# Patient Record
Sex: FEMALE | Race: OTHER | HISPANIC OR LATINO | Employment: FULL TIME | URBAN - NONMETROPOLITAN AREA
[De-identification: names, ages, dates, MRNs, and addresses within clinical notes are randomized per-mention and may not be internally consistent; named-entity substitution may affect disease eponyms.]

---

## 2024-06-05 ENCOUNTER — HOSPITAL ENCOUNTER (EMERGENCY)
Facility: HOSPITAL | Age: 49
Discharge: HOME | End: 2024-06-05
Attending: EMERGENCY MEDICINE
Payer: COMMERCIAL

## 2024-06-05 ENCOUNTER — APPOINTMENT (OUTPATIENT)
Dept: RADIOLOGY | Facility: HOSPITAL | Age: 49
End: 2024-06-05
Payer: COMMERCIAL

## 2024-06-05 VITALS
WEIGHT: 170 LBS | OXYGEN SATURATION: 100 % | TEMPERATURE: 97.3 F | RESPIRATION RATE: 15 BRPM | HEART RATE: 70 BPM | SYSTOLIC BLOOD PRESSURE: 132 MMHG | BODY MASS INDEX: 31.28 KG/M2 | DIASTOLIC BLOOD PRESSURE: 74 MMHG | HEIGHT: 62 IN

## 2024-06-05 DIAGNOSIS — N83.201 CYST OF RIGHT OVARY: Primary | ICD-10-CM

## 2024-06-05 LAB
ALBUMIN SERPL BCP-MCNC: 4.1 G/DL (ref 3.4–5)
ALP SERPL-CCNC: 50 U/L (ref 33–110)
ALT SERPL W P-5'-P-CCNC: 11 U/L (ref 7–45)
ANION GAP SERPL CALC-SCNC: 9 MMOL/L (ref 10–20)
APPEARANCE UR: CLEAR
AST SERPL W P-5'-P-CCNC: 16 U/L (ref 9–39)
BASOPHILS # BLD AUTO: 0.02 X10*3/UL (ref 0–0.1)
BASOPHILS NFR BLD AUTO: 0.6 %
BILIRUB SERPL-MCNC: 0.2 MG/DL (ref 0–1.2)
BILIRUB UR STRIP.AUTO-MCNC: NEGATIVE MG/DL
BUN SERPL-MCNC: 25 MG/DL (ref 6–23)
CALCIUM SERPL-MCNC: 8.8 MG/DL (ref 8.6–10.3)
CHLORIDE SERPL-SCNC: 104 MMOL/L (ref 98–107)
CO2 SERPL-SCNC: 28 MMOL/L (ref 21–32)
COLOR UR: YELLOW
CREAT SERPL-MCNC: 0.73 MG/DL (ref 0.5–1.05)
EGFRCR SERPLBLD CKD-EPI 2021: >90 ML/MIN/1.73M*2
EOSINOPHIL # BLD AUTO: 0.03 X10*3/UL (ref 0–0.7)
EOSINOPHIL NFR BLD AUTO: 0.9 %
ERYTHROCYTE [DISTWIDTH] IN BLOOD BY AUTOMATED COUNT: 15.8 % (ref 11.5–14.5)
GLUCOSE SERPL-MCNC: 81 MG/DL (ref 74–99)
GLUCOSE UR STRIP.AUTO-MCNC: NEGATIVE MG/DL
HCT VFR BLD AUTO: 38.2 % (ref 36–46)
HGB BLD-MCNC: 11.8 G/DL (ref 12–16)
HOLD SPECIMEN: NORMAL
HYALINE CASTS #/AREA URNS AUTO: ABNORMAL /LPF
IMM GRANULOCYTES # BLD AUTO: 0 X10*3/UL (ref 0–0.7)
IMM GRANULOCYTES NFR BLD AUTO: 0 % (ref 0–0.9)
KETONES UR STRIP.AUTO-MCNC: NEGATIVE MG/DL
LACTATE SERPL-SCNC: 1.3 MMOL/L (ref 0.4–2)
LEUKOCYTE ESTERASE UR QL STRIP.AUTO: ABNORMAL
LYMPHOCYTES # BLD AUTO: 1.17 X10*3/UL (ref 1.2–4.8)
LYMPHOCYTES NFR BLD AUTO: 36 %
MCH RBC QN AUTO: 24.3 PG (ref 26–34)
MCHC RBC AUTO-ENTMCNC: 30.9 G/DL (ref 32–36)
MCV RBC AUTO: 79 FL (ref 80–100)
MONOCYTES # BLD AUTO: 0.35 X10*3/UL (ref 0.1–1)
MONOCYTES NFR BLD AUTO: 10.8 %
MUCOUS THREADS #/AREA URNS AUTO: ABNORMAL /LPF
NEUTROPHILS # BLD AUTO: 1.68 X10*3/UL (ref 1.2–7.7)
NEUTROPHILS NFR BLD AUTO: 51.7 %
NITRITE UR QL STRIP.AUTO: NEGATIVE
NRBC BLD-RTO: 0 /100 WBCS (ref 0–0)
PH UR STRIP.AUTO: 5 [PH]
PLATELET # BLD AUTO: 242 X10*3/UL (ref 150–450)
POTASSIUM SERPL-SCNC: 3.7 MMOL/L (ref 3.5–5.3)
PROT SERPL-MCNC: 6.8 G/DL (ref 6.4–8.2)
PROT UR STRIP.AUTO-MCNC: ABNORMAL MG/DL
RBC # BLD AUTO: 4.85 X10*6/UL (ref 4–5.2)
RBC # UR STRIP.AUTO: NEGATIVE /UL
RBC #/AREA URNS AUTO: ABNORMAL /HPF
SODIUM SERPL-SCNC: 137 MMOL/L (ref 136–145)
SP GR UR STRIP.AUTO: 1.03
SQUAMOUS #/AREA URNS AUTO: ABNORMAL /HPF
UROBILINOGEN UR STRIP.AUTO-MCNC: <2 MG/DL
WBC # BLD AUTO: 3.3 X10*3/UL (ref 4.4–11.3)
WBC #/AREA URNS AUTO: ABNORMAL /HPF

## 2024-06-05 PROCEDURE — 96374 THER/PROPH/DIAG INJ IV PUSH: CPT | Mod: 59

## 2024-06-05 PROCEDURE — 2500000004 HC RX 250 GENERAL PHARMACY W/ HCPCS (ALT 636 FOR OP/ED)

## 2024-06-05 PROCEDURE — 85025 COMPLETE CBC W/AUTO DIFF WBC: CPT | Performed by: EMERGENCY MEDICINE

## 2024-06-05 PROCEDURE — 76830 TRANSVAGINAL US NON-OB: CPT

## 2024-06-05 PROCEDURE — 2500000004 HC RX 250 GENERAL PHARMACY W/ HCPCS (ALT 636 FOR OP/ED): Performed by: EMERGENCY MEDICINE

## 2024-06-05 PROCEDURE — 81001 URINALYSIS AUTO W/SCOPE: CPT | Performed by: EMERGENCY MEDICINE

## 2024-06-05 PROCEDURE — 36415 COLL VENOUS BLD VENIPUNCTURE: CPT | Performed by: EMERGENCY MEDICINE

## 2024-06-05 PROCEDURE — 99285 EMERGENCY DEPT VISIT HI MDM: CPT | Mod: 25

## 2024-06-05 PROCEDURE — 80053 COMPREHEN METABOLIC PANEL: CPT | Performed by: EMERGENCY MEDICINE

## 2024-06-05 PROCEDURE — 74177 CT ABD & PELVIS W/CONTRAST: CPT

## 2024-06-05 PROCEDURE — 2550000001 HC RX 255 CONTRASTS: Performed by: EMERGENCY MEDICINE

## 2024-06-05 PROCEDURE — 83605 ASSAY OF LACTIC ACID: CPT | Performed by: EMERGENCY MEDICINE

## 2024-06-05 PROCEDURE — 96361 HYDRATE IV INFUSION ADD-ON: CPT

## 2024-06-05 PROCEDURE — 87086 URINE CULTURE/COLONY COUNT: CPT | Mod: CONLAB | Performed by: EMERGENCY MEDICINE

## 2024-06-05 RX ORDER — KETOROLAC TROMETHAMINE 30 MG/ML
30 INJECTION, SOLUTION INTRAMUSCULAR; INTRAVENOUS ONCE
Status: COMPLETED | OUTPATIENT
Start: 2024-06-05 | End: 2024-06-05

## 2024-06-05 RX ORDER — KETOROLAC TROMETHAMINE 30 MG/ML
INJECTION, SOLUTION INTRAMUSCULAR; INTRAVENOUS
Status: COMPLETED
Start: 2024-06-05 | End: 2024-06-05

## 2024-06-05 RX ORDER — TRAMADOL HYDROCHLORIDE 50 MG/1
50 TABLET ORAL EVERY 6 HOURS PRN
Qty: 12 TABLET | Refills: 0 | Status: SHIPPED | OUTPATIENT
Start: 2024-06-05 | End: 2024-06-08

## 2024-06-05 RX ORDER — SODIUM CHLORIDE 9 MG/ML
125 INJECTION, SOLUTION INTRAVENOUS CONTINUOUS
Status: DISCONTINUED | OUTPATIENT
Start: 2024-06-05 | End: 2024-06-05 | Stop reason: HOSPADM

## 2024-06-05 RX ADMIN — KETOROLAC TROMETHAMINE 30 MG: 30 INJECTION, SOLUTION INTRAMUSCULAR; INTRAVENOUS at 09:27

## 2024-06-05 RX ADMIN — KETOROLAC TROMETHAMINE 30 MG: 30 INJECTION, SOLUTION INTRAMUSCULAR at 09:27

## 2024-06-05 RX ADMIN — SODIUM CHLORIDE 125 ML/HR: 9 INJECTION, SOLUTION INTRAVENOUS at 09:27

## 2024-06-05 RX ADMIN — IOHEXOL 75 ML: 350 INJECTION, SOLUTION INTRAVENOUS at 11:10

## 2024-06-05 ASSESSMENT — PAIN - FUNCTIONAL ASSESSMENT: PAIN_FUNCTIONAL_ASSESSMENT: 0-10

## 2024-06-05 ASSESSMENT — COLUMBIA-SUICIDE SEVERITY RATING SCALE - C-SSRS
2. HAVE YOU ACTUALLY HAD ANY THOUGHTS OF KILLING YOURSELF?: NO
6. HAVE YOU EVER DONE ANYTHING, STARTED TO DO ANYTHING, OR PREPARED TO DO ANYTHING TO END YOUR LIFE?: NO
1. IN THE PAST MONTH, HAVE YOU WISHED YOU WERE DEAD OR WISHED YOU COULD GO TO SLEEP AND NOT WAKE UP?: NO

## 2024-06-05 ASSESSMENT — PAIN SCALES - GENERAL: PAINLEVEL_OUTOF10: 8

## 2024-06-05 NOTE — ED PROVIDER NOTES
[facility]   [facility]  Provider Note  6/5/2024  8:52 AM  AC11/AC11      Chief Complaint   Patient presents with    Abdominal Pain     Pt has LRQ pain she associates with her known ovarian cysts. Pt said the pain started around 1am and has progressively gotten worse. Pt said she goes to [facility] and she feels like they have been blowing her off the last few months.         History of Present Illness:   Nicole Finnegan is a 48 y.o. female presenting to the ED for right-sided pelvic pain, beginning yesterday.  The complaint has been persistent, moderate in severity, and worsened by nothing.  Patient is status post tubal ligation.  She has been experiencing intermittent pelvic pain for the past several months.  Sometimes during her menses sometimes after her menses.  She has been seen by the OB staff in [place] without resolution of her symptoms.  She denies any vaginal discharge or STD exposure.  She denies any urinary frequency or burning.  She has no flank pain.  She has no history of appendicitis or previous diverticulitis.  She is status post tubal ligation.      Review of Systems:   Pertinent positives and review of systems as noted above.  Remaining 10 review of systems is negative or noncontributory to today's episode of care.  Review of Systems       --------------------------------------------- PAST HISTORY ---------------------------------------------  Past Medical History: Pelvic pain    Past Surgical History: Tubal ligation    Social History:   Social History     Social History Narrative    Not on file        Family History: family history is not on file. Unless otherwise noted, family history is non contributory    Patient's Medications    No medications on file      The patient’s home medications have been reviewed.    Allergies: Sulfa (sulfonamide antibiotics)    -------------------------------------------------- RESULTS -------------------------------------------------  All laboratory and radiology results  have been personally reviewed by myself   LABS:  Labs Reviewed   CBC WITH AUTO DIFFERENTIAL - Abnormal       Result Value    WBC 3.3 (*)     nRBC 0.0      RBC 4.85      Hemoglobin 11.8 (*)     Hematocrit 38.2      MCV 79 (*)     MCH 24.3 (*)     MCHC 30.9 (*)     RDW 15.8 (*)     Platelets 242      Neutrophils % 51.7      Immature Granulocytes %, Automated 0.0      Lymphocytes % 36.0      Monocytes % 10.8      Eosinophils % 0.9      Basophils % 0.6      Neutrophils Absolute 1.68      Immature Granulocytes Absolute, Automated 0.00      Lymphocytes Absolute 1.17 (*)     Monocytes Absolute 0.35      Eosinophils Absolute 0.03      Basophils Absolute 0.02     COMPREHENSIVE METABOLIC PANEL - Abnormal    Glucose 81      Sodium 137      Potassium 3.7      Chloride 104      Bicarbonate 28      Anion Gap 9 (*)     Urea Nitrogen 25 (*)     Creatinine 0.73      eGFR >90      Calcium 8.8      Albumin 4.1      Alkaline Phosphatase 50      Total Protein 6.8      AST 16      Bilirubin, Total 0.2      ALT 11     URINALYSIS WITH REFLEX CULTURE AND MICROSCOPIC - Abnormal    Color, Urine Yellow      Appearance, Urine Clear      Specific Gravity, Urine 1.028      pH, Urine 5.0      Protein, Urine 30 (1+) (*)     Glucose, Urine NEGATIVE      Blood, Urine NEGATIVE      Ketones, Urine NEGATIVE      Bilirubin, Urine NEGATIVE      Urobilinogen, Urine <2.0      Nitrite, Urine NEGATIVE      Leukocyte Esterase, Urine SMALL (1+) (*)    MICROSCOPIC ONLY, URINE - Abnormal    WBC, Urine 1-5      RBC, Urine NONE      Squamous Epithelial Cells, Urine 1-9 (SPARSE)      Mucus, Urine 1+      Hyaline Casts, Urine OCCASIONAL (*)    LACTATE - Normal    Lactate 1.3      Narrative:     Venipuncture immediately after or during the administration of Metamizole may lead to falsely low results. Testing should be performed immediately  prior to Metamizole dosing.   URINE CULTURE   URINALYSIS WITH REFLEX CULTURE AND MICROSCOPIC    Narrative:     The following  orders were created for panel order Urinalysis with Reflex Culture and Microscopic.  Procedure                               Abnormality         Status                     ---------                               -----------         ------                     Urinalysis with Reflex C...[[id number]]  Abnormal            Final result               Extra Urine Gray Tube[[id number]]                            In process                   Please view results for these tests on the individual orders.   EXTRA URINE GRAY TUBE         RADIOLOGY:  Interpreted by Radiologist.  CT abdomen pelvis w IV contrast   Final Result   Intrahepatic biliary ductal dilatation is present diffusely. Common   duct is dilated above the level of the head of the pancreas with   diameter 1.4 cm. The duct is not dilated within the pancreas. The   dilatation may be sequela of cholecystectomy, but a stricture can not   be excluded. No definite bile duct stones are noted. MRCP might be   helpful for further evaluation.        1.4 cm subcapsular hypodense mass lateral aspect segment 8 of the   liver does not have features of a cyst. An incidental hemangioma is   possible. This could be further evaluated with MR along with biliary   evaluation.        Fibroid uterus             MACRO:   None.        Signed by: Gwendolyn Reyes 6/5/2024 11:44 AM   Dictation workstation:   [id number]      US PELVIS TRANSABDOMINAL WITH TRANSVAGINAL   Final Result   Fibroid uterus        Small cyst is noted on the right along the surface of the ovary and   is probably paraovarian rather than arising from the right ovary.   Maximum dimension of the cyst is 3.2 cm.             MACRO:   None        Signed by: Gwendolyn Reyes 6/5/2024 11:49 AM   Dictation workstation:   [id number]          No results found for this or any previous visit (from the past 4464 hour(s)).  ------------------------- NURSING NOTES AND VITALS REVIEWED ---------------------------   The nursing notes  "within the ED encounter and vital signs as below have been reviewed.   /74   Pulse 70   Temp 36.3 °C (97.3 °F) (Oral)   Resp 15   Ht 1.575 m (5' 2\")   Wt 77.1 kg (170 lb)   LMP 06/03/2024 (Exact Date)   SpO2 100%   BMI 31.09 kg/m²   Oxygen Saturation Interpretation: Normal      ---------------------------------------------------PHYSICAL EXAM--------------------------------------  Physical Exam   Constitutional/General: Alert,  well appearing, non toxic in NAD  Head: Normocephalic and atraumatic  Eyes: PERRL, EOMI, conjunctiva normal, sclera non icteric  Mouth: Oropharynx clear, handling secretions, no trismus, no asymmetry of the posterior oropharynx or uvular edema  Neck: Supple, full ROM, non tender to palpation in the midline, no stridor, no crepitus, no meningeal signs  Respiratory: Lungs clear to auscultation bilaterally, no wheezes, rales, or rhonchi. Not in respiratory distress  Cardiovascular:  Regular rate. Regular rhythm. No murmurs, gallops, or rubs. 2+ distal pulses  Chest: No chest wall tenderness  GI:  Abdomen Soft, mild right lower quadrant tenderness, Non distended.  +BS. No organomegaly, no palpable masses,  No rebound, guarding, or rigidity.  No hernia, no CVA tenderness  Musculoskeletal: Moves all extremities x 4. Warm and well perfused, no clubbing, cyanosis, or edema. Capillary refill <3 seconds  Integument: skin warm and dry. No rashes.   Lymphatic: no lymphadenopathy noted  Neurologic: No focal deficits, symmetric strength 5/5 in the upper and lower extremities bilaterally  Psychiatric: Normal Affect    Procedures    ------------------------------ ED COURSE/MEDICAL DECISION MAKING----------------------  Diagnoses as of 06/05/24 1226   Cyst of right ovary      Patient having pelvic pain sometimes right side left side for several months off-and-on.  This sounds most like endometriosis.  She has been told in the past she has an ovarian cyst.  Will check a CT scan and ultrasound " laboratory testing and treat her pain while results are pending.    Patient send CT scan shows a's possible liver hemangioma but no other significant findings.  There is minimal dilatation of the extrahepatic bile ducts.  Patient has a right paraovarian cyst about 3.2 cm in size which correlates with her pain.  There is no evidence of appendicitis.  The patient's white count is just below normal limit at 3.3.  Remaining CBC is unremarkable.  Electrolytes/no significant abnormalities.  Urinalysis has trace leukocytes, 0-5 white cells per high-powered field.  The patient is stable for outpatient management of her paraovarian cyst.    Medical Decision Making:   Discharged to home  Diagnoses as of 06/05/24 1226   Cyst of right ovary      Counseling:   The emergency provider has spoken with the patient and discussed today’s results, in addition to providing specific details for the plan of care and counseling regarding the diagnosis and prognosis.  Questions are answered at this time and they are agreeable with the plan.      --------------------------------- IMPRESSION AND DISPOSITION ---------------------------------        IMPRESSION  1. Cyst of right ovary        DISPOSITION  Disposition: Discharge to home  Patient condition is fair      Billing Provider Critical Care Time: 0 minutes     Cory Quinonez MD  06/05/24 [id number]

## 2024-06-05 NOTE — DISCHARGE INSTRUCTIONS
You have a paraovarian cyst which require GYN follow-up.  You may take tramadol for pain as prescribed call your GYN doctor today for follow-up in 1 to 2 days.    Return for worsening symptoms.

## 2024-06-05 NOTE — ED TRIAGE NOTES
Pt has LRQ pain she associates with her known ovarian cysts. Pt said the pain started around 1am and has progressively gotten worse. Pt said she goes to [facility] and she feels like they have been blowing her off the last few months.

## 2024-06-07 LAB — BACTERIA UR CULT: NORMAL
